# Patient Record
Sex: MALE | Race: WHITE | Employment: FULL TIME | URBAN - METROPOLITAN AREA
[De-identification: names, ages, dates, MRNs, and addresses within clinical notes are randomized per-mention and may not be internally consistent; named-entity substitution may affect disease eponyms.]

---

## 2021-07-11 PROBLEM — F41.1 GAD (GENERALIZED ANXIETY DISORDER): Status: ACTIVE | Noted: 2021-07-11

## 2021-07-12 ENCOUNTER — EKG ENCOUNTER (OUTPATIENT)
Dept: LAB | Facility: HOSPITAL | Age: 41
End: 2021-07-12
Attending: NURSE PRACTITIONER
Payer: COMMERCIAL

## 2021-07-12 LAB
ATRIAL RATE: 65 BPM
P AXIS: 64 DEGREES
P-R INTERVAL: 220 MS
Q-T INTERVAL: 408 MS
QRS DURATION: 102 MS
QTC CALCULATION (BEZET): 424 MS
R AXIS: 67 DEGREES
T AXIS: 50 DEGREES
VENTRICULAR RATE: 65 BPM

## 2021-07-12 PROCEDURE — 93005 ELECTROCARDIOGRAM TRACING: CPT

## 2021-07-12 PROCEDURE — 93010 ELECTROCARDIOGRAM REPORT: CPT | Performed by: INTERNAL MEDICINE

## 2021-07-12 NOTE — PROGRESS NOTES
No change in your EKG results, 1st degree AV block still present. Follow-up with PCP as needed and for any changes in your symptoms.